# Patient Record
Sex: FEMALE | Race: WHITE | NOT HISPANIC OR LATINO | Employment: UNEMPLOYED | URBAN - METROPOLITAN AREA
[De-identification: names, ages, dates, MRNs, and addresses within clinical notes are randomized per-mention and may not be internally consistent; named-entity substitution may affect disease eponyms.]

---
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
## 2020-01-28 ENCOUNTER — OFFICE VISIT (OUTPATIENT)
Dept: FAMILY MEDICINE CLINIC | Facility: CLINIC | Age: 20
End: 2020-01-28
Payer: COMMERCIAL

## 2020-01-28 VITALS
BODY MASS INDEX: 22.63 KG/M2 | OXYGEN SATURATION: 97 % | WEIGHT: 135.8 LBS | HEIGHT: 65 IN | TEMPERATURE: 97.8 F | SYSTOLIC BLOOD PRESSURE: 104 MMHG | HEART RATE: 100 BPM | DIASTOLIC BLOOD PRESSURE: 70 MMHG | RESPIRATION RATE: 16 BRPM

## 2020-01-28 DIAGNOSIS — F41.9 ANXIETY: Primary | ICD-10-CM

## 2020-01-28 PROCEDURE — 3008F BODY MASS INDEX DOCD: CPT | Performed by: NURSE PRACTITIONER

## 2020-01-28 PROCEDURE — 99213 OFFICE O/P EST LOW 20 MIN: CPT | Performed by: NURSE PRACTITIONER

## 2020-01-28 NOTE — PROGRESS NOTES
FAMILY PRACTICE OFFICE VISIT       NAME: Sole Carter  AGE: 23 y o  SEX: female       : 2000        MRN: [id number]      Assessment and Plan     Problem List Items Addressed This Visit     None      Visit Diagnoses     Anxiety    -  Primary    Relevant Medications    sertraline (ZOLOFT) 50 mg tablet        1  Anxiety  sertraline (ZOLOFT) 50 mg tablet       This 60-year-old female presents today for anxiety and panic attacks  She struggled a lot with anxiety in high school  She never took any medications or attended counseling  She is seeking care now, because she does not want her own anxiety to interfere with the care of her baby  Recommend treatment with sertraline, start with 25 milligrams daily for 1 week, and then increase to 50 milligrams daily  Reviewed side effect profile of this medication  She is aware may cause suicidal ideations, instructed to go the emergency immediately if she is experiencing these thoughts  Reviewed onset of action and avoiding abrupt withdrawal of this medication  She verbalizes understanding  I do believe she would benefit from counseling, however she declines at this time  I have made her aware [facility] is available in our office, if she decides to pursue counseling  She will return to the office in 1 month for follow-up, she will call in the interim with any questions or concerns  Still have not been able to view her previous blood work completed at [facility]  Patient reports she completed forms at her ob/gyn office yesterday to leonel access to care everywhere  Will need to check TSH if not previously completed  Chief Complaint     Chief Complaint   Patient presents with    Anxiety     Pt is here for anxiety attacks, focus issues 3 + days, blood tests       History of Present Illness     Sole Carter is a 60-year-old female presenting today for anxiety      She has a history of struggling with anxiety in high school,  but has not had any problems since high school  until now  Two days ago she had a panic attack at home  Panic attack starts with her feeling very hot, her arms and legs feeling numb, and lightheaded  In the past, if symptoms persisted,  She would pass out  Symptoms usually last 30 minutes to 1 hour then resolved  When she feels a panic attack coming on she usually sits down, drinks a lot of water and tries to relax  She has never taken any medication for anxiety or panic attacks  She is concerned, because she has a 8month-old baby  She is a stay-at-home mom  She does not want anxiety or panic attacks to interfere with the care of her baby  She is easily overwhelmed  When she has a lot of things to do, she starts to feel panicky  She finds it difficult to focus on a simple tasks  Notes a tasks that usually should take 15 minutes ends up taking an hour  She does cry easily when she is overwhelmed  She has never been to counseling  She does have good support with her parents and her friends  Denies any feelings of depression  Denies any known family history of anxiety or depression  She does get some time to care for her own needs  Baby typically goes to bed around 8 p m , so she has evenings  She also has a horse,  That she enjoys spending time with  Her parents babysit, so she can spend time with her horse  She hopes to return to school to further her education in the future  Review of Systems   Review of Systems   Constitutional: Negative  Neurological: Positive for numbness  Negative for dizziness, weakness and headaches  Psychiatric/Behavioral: Negative for dysphoric mood, sleep disturbance and suicidal ideas  The patient is nervous/anxious  Active Problem List   There is no problem list on file for this patient        Past Medical History:  Past Medical History:   Diagnosis Date    Allergic        Past Surgical History:  Past Surgical History: Procedure Laterality Date     SECTION         Family History:  Family History   Problem Relation Age of Onset    Hypertension Mother     Factor V Leiden deficiency Mother     Diabetes Father        Social History:  Social History     Socioeconomic History    Marital status: /Civil Union     Spouse name: Not on file    Number of children: Not on file    Years of education: Not on file    Highest education level: Not on file   Occupational History    Not on file   Social Needs    Financial resource strain: Not on file    Food insecurity:     Worry: Not on file     Inability: Not on file    Transportation needs:     Medical: Not on file     Non-medical: Not on file   Tobacco Use    Smoking status: Never Smoker    Smokeless tobacco: Never Used   Substance and Sexual Activity    Alcohol use: Not Currently    Drug use: Never    Sexual activity: Yes     Partners: Male   Lifestyle    Physical activity:     Days per week: Not on file     Minutes per session: Not on file    Stress: Not on file   Relationships    Social connections:     Talks on phone: Not on file     Gets together: Not on file     Attends Gnosticist service: Not on file     Active member of club or organization: Not on file     Attends meetings of clubs or organizations: Not on file     Relationship status: Not on file    Intimate partner violence:     Fear of current or ex partner: Not on file     Emotionally abused: Not on file     Physically abused: Not on file     Forced sexual activity: Not on file   Other Topics Concern    Not on file   Social History Narrative        One child 6 months old 2019    Stay at home mom       I have reviewed the patient's medical history in detail; there are no changes to the history as noted in the electronic medical record      Objective     Vitals:    20 0945   BP: 104/70   Pulse: 100   Resp: 16   Temp: 97 8 °F (36 6 °C)   TempSrc: Tympanic   SpO2: 97% Weight: 61 6 kg (135 lb 12 8 oz)   Height: 5' 4 75" (1 645 m)     Wt Readings from Last 3 Encounters:   01/28/20 61 6 kg (135 lb 12 8 oz) (65 %, Z= 0 38)*   12/05/19 63 5 kg (140 lb) (71 %, Z= 0 56)*     * Growth percentiles are based on CDC (Girls, 2-20 Years) data  Physical Exam   Constitutional: She appears well-developed and well-nourished  No distress  Cardiovascular: Normal rate, regular rhythm and normal heart sounds  Pulmonary/Chest: Effort normal and breath sounds normal    Psychiatric: She has a normal mood and affect  Nursing note and vitals reviewed  ALLERGIES:  No Known Allergies    Current Medications     Current Outpatient Medications   Medication Sig Dispense Refill    desogestrel-ethinyl estradiol (APRI) 0 15-30 MG-MCG per tablet Take 1 tablet by mouth daily      multivitamin (THERAGRAN) TABS Take 1 tablet by mouth daily      sertraline (ZOLOFT) 50 mg tablet Take 1/2 tablet (25 mg) daily for 1 week, then increase to 1 tablet (50 mg) daily  30 tablet 1     No current facility-administered medications for this visit  Health Maintenance     Health Maintenance   Topic Date Due    DTaP,Tdap,and Td Vaccines (1 - Tdap) 09/18/2007    HPV Vaccine (1 - Female 2-dose series) 09/18/2011    HIV Screening  09/18/2015    Chlamydia Screening  09/18/2016    Annual Physical  09/18/2018    Influenza Vaccine  07/01/2019    Depression Screening PHQ  12/05/2020    BMI: Adult  01/28/2021    Pneumococcal Vaccine: 65+ Years (1 of 2 - PCV13) 09/18/2065    Pneumococcal Vaccine: Pediatrics (0 to 5 Years) and At-Risk Patients (6 to 59 Years)  Aged Out    HIB Vaccine  Aged Out    Hepatitis B Vaccine  Aged Out    IPV Vaccine  Aged Out    Hepatitis A Vaccine  Aged Out    Meningococcal ACWY Vaccine  Aged Out       There is no immunization history on file for this patient      Liberty Castaneda

## 2020-03-05 ENCOUNTER — OFFICE VISIT (OUTPATIENT)
Dept: FAMILY MEDICINE CLINIC | Facility: CLINIC | Age: 20
End: 2020-03-05
Payer: COMMERCIAL

## 2020-03-05 VITALS
SYSTOLIC BLOOD PRESSURE: 110 MMHG | BODY MASS INDEX: 22.73 KG/M2 | TEMPERATURE: 98.1 F | HEART RATE: 100 BPM | DIASTOLIC BLOOD PRESSURE: 80 MMHG | RESPIRATION RATE: 16 BRPM | OXYGEN SATURATION: 98 % | WEIGHT: 136.4 LBS | HEIGHT: 65 IN

## 2020-03-05 DIAGNOSIS — F41.9 ANXIETY: ICD-10-CM

## 2020-03-05 PROCEDURE — 1036F TOBACCO NON-USER: CPT | Performed by: NURSE PRACTITIONER

## 2020-03-05 PROCEDURE — 3008F BODY MASS INDEX DOCD: CPT | Performed by: NURSE PRACTITIONER

## 2020-03-05 PROCEDURE — 99213 OFFICE O/P EST LOW 20 MIN: CPT | Performed by: NURSE PRACTITIONER

## 2020-03-05 NOTE — PROGRESS NOTES
FAMILY PRACTICE OFFICE VISIT       NAME: Nazia Saavedra  AGE: 23 y o  SEX: female       : 2000        MRN: [id number]    DATE: 3/5/2020    Assessment and Plan     Problem List Items Addressed This Visit     None      Visit Diagnoses     Anxiety        Relevant Medications    sertraline (ZOLOFT) 50 mg tablet        1  Anxiety  sertraline (ZOLOFT) 50 mg tablet        This 49-year-old female presents today for follow-up on anxiety  Has been taking sertraline for approximately 1 month now, and has noted improvement  She is able to focus on tasks better  She has no further numbness or tingling in her extremities  She still experiences feeling overwhelmed when she has a lot of things to do before she needs to leave the house  Will increase sertraline from 50 milligrams daily to 75 milligrams daily  She will return to office in 4-6 weeks for follow-up  She will call in the interim if she has any questions or concerns  Chief Complaint     Chief Complaint   Patient presents with    Follow-up     Pt is here for f/u med check       History of Present Illness     Nazia Saavedra is a 49-year-old female presenting today for follow-up on anxiety  She started on sertraline approximately 1 month ago  Feels it is helping  She is able to focus better  No more numbness or tingling in her extremities  She does still get overwhelmed when she has a lot of things to do before she needs to leave the house  Sometimes she has to sit down and rest, because she is overwhelmed she can't get anything done  She has experienced no side effects from sertraline  Review of Systems   Review of Systems   Constitutional: Negative  Psychiatric/Behavioral: Negative for dysphoric mood, self-injury, sleep disturbance and suicidal ideas  The patient is nervous/anxious  Active Problem List   There is no problem list on file for this patient        Past Medical History:  Past Medical History:   Diagnosis Date    Allergic        Past Surgical History:  Past Surgical History:   Procedure Laterality Date     SECTION  2019       Family History:  Family History   Problem Relation Age of Onset    Hypertension Mother     Factor V Leiden deficiency Mother     Diabetes Father        Social History:  Social History     Socioeconomic History    Marital status: /Civil Union     Spouse name: Not on file    Number of children: Not on file    Years of education: Not on file    Highest education level: Not on file   Occupational History    Not on file   Social Needs    Financial resource strain: Not on file    Food insecurity:     Worry: Not on file     Inability: Not on file    Transportation needs:     Medical: Not on file     Non-medical: Not on file   Tobacco Use    Smoking status: Never Smoker    Smokeless tobacco: Never Used   Substance and Sexual Activity    Alcohol use: Not Currently    Drug use: Never    Sexual activity: Yes     Partners: Male   Lifestyle    Physical activity:     Days per week: Not on file     Minutes per session: Not on file    Stress: Not on file   Relationships    Social connections:     Talks on phone: Not on file     Gets together: Not on file     Attends Voodoo service: Not on file     Active member of club or organization: Not on file     Attends meetings of clubs or organizations: Not on file     Relationship status: Not on file    Intimate partner violence:     Fear of current or ex partner: Not on file     Emotionally abused: Not on file     Physically abused: Not on file     Forced sexual activity: Not on file   Other Topics Concern    Not on file   Social History Narrative        One child 6 months old 2019    Stay at home mom       I have reviewed the patient's medical history in detail; there are no changes to the history as noted in the electronic medical record      Objective     Vitals:    20 1000   BP: 110/80   Pulse: 100 Resp: 16   Temp: 98 1 °F (36 7 °C)   TempSrc: Tympanic   SpO2: 98%   Weight: 61 9 kg (136 lb 6 4 oz)   Height: 5' 4 75" (1 645 m)     Wt Readings from Last 3 Encounters:   03/05/20 61 9 kg (136 lb 6 4 oz) (65 %, Z= 0 39)*   01/28/20 61 6 kg (135 lb 12 8 oz) (65 %, Z= 0 38)*   12/05/19 63 5 kg (140 lb) (71 %, Z= 0 56)*     * Growth percentiles are based on CDC (Girls, 2-20 Years) data  Physical Exam   Constitutional: She appears well-developed and well-nourished  No distress  Cardiovascular: Normal rate, regular rhythm and normal heart sounds  Pulmonary/Chest: Effort normal and breath sounds normal    Psychiatric: She has a normal mood and affect  Nursing note and vitals reviewed  ALLERGIES:  No Known Allergies    Current Medications     Current Outpatient Medications   Medication Sig Dispense Refill    desogestrel-ethinyl estradiol (APRI) 0 15-30 MG-MCG per tablet Take 1 tablet by mouth daily      multivitamin (THERAGRAN) TABS Take 1 tablet by mouth daily      sertraline (ZOLOFT) 50 mg tablet Take 1 5 tablets (75 mg) daily 45 tablet 5     No current facility-administered medications for this visit  Health Maintenance     Health Maintenance   Topic Date Due    DTaP,Tdap,and Td Vaccines (1 - Tdap) 09/18/2007    HPV Vaccine (1 - Female 2-dose series) 09/18/2011    HIV Screening  09/18/2015    Chlamydia Screening  09/18/2016    Annual Physical  09/18/2018    Influenza Vaccine  07/01/2019    Depression Screening PHQ  12/05/2020    BMI: Adult  03/05/2021    Pneumococcal Vaccine: 65+ Years (1 of 2 - PCV13) 09/18/2065    Pneumococcal Vaccine: Pediatrics (0 to 5 Years) and At-Risk Patients (6 to 59 Years)  Aged Out    HIB Vaccine  Aged Out    Hepatitis B Vaccine  Aged Out    IPV Vaccine  Aged Out    Hepatitis A Vaccine  Aged Out    Meningococcal ACWY Vaccine  Aged Out       There is no immunization history on file for this patient      Liberty Villegas

## 2023-01-26 ENCOUNTER — OFFICE VISIT (OUTPATIENT)
Dept: FAMILY MEDICINE CLINIC | Facility: CLINIC | Age: 23
End: 2023-01-26

## 2023-01-26 VITALS
OXYGEN SATURATION: 99 % | SYSTOLIC BLOOD PRESSURE: 110 MMHG | BODY MASS INDEX: 25.33 KG/M2 | TEMPERATURE: 98.4 F | HEIGHT: 65 IN | WEIGHT: 152 LBS | RESPIRATION RATE: 16 BRPM | DIASTOLIC BLOOD PRESSURE: 80 MMHG | HEART RATE: 104 BPM

## 2023-01-26 DIAGNOSIS — J32.9 SINUSITIS, UNSPECIFIED CHRONICITY, UNSPECIFIED LOCATION: Primary | ICD-10-CM

## 2023-01-26 RX ORDER — AMOXICILLIN AND CLAVULANATE POTASSIUM 875; 125 MG/1; MG/1
1 TABLET, FILM COATED ORAL EVERY 12 HOURS SCHEDULED
Qty: 14 TABLET | Refills: 0 | Status: SHIPPED | OUTPATIENT
Start: 2023-01-26 | End: 2023-02-02

## 2023-01-26 NOTE — PROGRESS NOTES
FAMILY PRACTICE OFFICE VISIT       NAME: Jaycob Key  AGE: 25 y o  SEX: female       : 2000        MRN: [id number]    Assessment and Plan   1  Sinusitis, unspecified chronicity, unspecified location  -     amoxicillin-clavulanate (AUGMENTIN) 875-125 mg per tablet; Take 1 tablet by mouth every 12 (twelve) hours for 7 days     Start Augmentin 1 tablet twice daily with food  Call if symptoms do not improve with antibiotic  May continue to use Claritin and Tylenol as needed  Chief Complaint     Chief Complaint   Patient presents with   • Cold Like Symptoms     Sinus, sore throat, earache, headache and cough 1 + wk       History of Present Illness     Ana Lilia Sung is a 25year old female presenting today for URI symptoms  Family has been sick a lot recently  Started 1 week ago  Negative home COVID-19 test   Chills, sweats  SInus pressure  Lost voice  Sore throat  Headaches  Ear pressure  Nasal congestion  Mild cough  No Nausea, vomiting, or diarrhea  No chest tightness, Wheezing, shortness of breath  OTC Claritin  Helped a little  Tylenol as needed  Review of Systems   Review of Systems   Constitutional: Positive for chills, diaphoresis and fatigue  Negative for fever  HENT: Positive for congestion, ear pain (pressure), postnasal drip, rhinorrhea, sinus pressure, sore throat and voice change  Respiratory: Positive for cough  Negative for chest tightness, shortness of breath and wheezing  Cardiovascular: Negative  Gastrointestinal: Negative  Musculoskeletal: Negative for myalgias  Neurological: Positive for headaches  I have reviewed the patient's medical history in detail; there are no changes to the history as noted in the electronic medical record      Objective     Vitals:    23 1113   BP: 110/80   Pulse: 104   Resp: 16   Temp: 98 4 °F (36 9 °C)   TempSrc: Temporal   SpO2: 99%   Weight: 68 9 kg (152 lb)   Height: 5' 4 96" (1 65 m) Wt Readings from Last 3 Encounters:   01/26/23 68 9 kg (152 lb)   03/05/20 61 9 kg (136 lb 6 4 oz) (65 %, Z= 0 39)*   01/28/20 61 6 kg (135 lb 12 8 oz) (65 %, Z= 0 38)*     * Growth percentiles are based on [facility] (Girls, 2-20 Years) data  Physical Exam  Vitals and nursing note reviewed  Constitutional:       General: She is not in acute distress  Appearance: Normal appearance  She is not ill-appearing  HENT:      Head: Atraumatic  Right Ear: Tympanic membrane normal       Left Ear: Tympanic membrane normal       Nose: Congestion and rhinorrhea present  Mouth/Throat:      Mouth: Mucous membranes are moist       Pharynx: Oropharynx is clear  Eyes:      Conjunctiva/sclera: Conjunctivae normal    Cardiovascular:      Rate and Rhythm: Normal rate and regular rhythm  Heart sounds: No murmur heard  Pulmonary:      Effort: Pulmonary effort is normal  No respiratory distress  Breath sounds: Normal breath sounds  Musculoskeletal:      Cervical back: Normal range of motion and neck supple  Right lower leg: No edema  Left lower leg: No edema  Lymphadenopathy:      Cervical: No cervical adenopathy  Neurological:      Mental Status: She is alert  Psychiatric:         Mood and Affect: Mood normal        BMI Counseling: Body mass index is 25 32 kg/m²  The BMI is above normal  Exercise recommendations include exercising 3-5 times per week  Rationale for BMI follow-up plan is due to patient being overweight or obese  Weight is acceptable for age  Depression Screening and Follow-up Plan: Patient was screened for depression during today's encounter  They screened negative with a PHQ-2 score of 0          ALLERGIES:  No Known Allergies    Current Medications     Current Outpatient Medications   Medication Sig Dispense Refill   • amoxicillin-clavulanate (AUGMENTIN) 875-125 mg per tablet Take 1 tablet by mouth every 12 (twelve) hours for 7 days 14 tablet 0   • multivitamin [facility]) TABS Take 1 tablet by mouth daily     • PARAGARD INTRAUTERINE COPPER IU by Intrauterine route       No current facility-administered medications for this visit  Health Maintenance     Health Maintenance   Topic Date Due   • Hepatitis C Screening  Never done   • HPV Vaccine (1 - 2-dose series) Never done   • HIV Screening  Never done   • Chlamydia Screening  Never done   • Annual Physical  Never done   • DTaP,Tdap,and Td Vaccines (1 - Tdap) Never done   • Cervical Cancer Screening  Never done   • COVID-19 Vaccine (1) 04/27/2023 (Originally [birth year]18/2001)   • Influenza Vaccine (1) 06/30/2023 (Originally 9/1/2022)   • Depression Screening  01/26/2024   • BMI: Followup Plan  01/26/2024   • BMI: Adult  01/26/2024   • Pneumococcal Vaccine: Pediatrics (0 to 5 Years) and At-Risk Patients (6 to 59 Years)  Aged Out   • HIB Vaccine  Aged Out   • IPV Vaccine  Aged Out   • Hepatitis A Vaccine  Aged Out   • Meningococcal ACWY Vaccine  Aged Out       There is no immunization history on file for this patient      Liberty Alcantar

## 2023-06-07 ENCOUNTER — OFFICE VISIT (OUTPATIENT)
Dept: FAMILY MEDICINE CLINIC | Facility: CLINIC | Age: 23
End: 2023-06-07
Payer: COMMERCIAL

## 2023-06-07 VITALS
BODY MASS INDEX: 26.26 KG/M2 | RESPIRATION RATE: 18 BRPM | HEART RATE: 95 BPM | HEIGHT: 65 IN | SYSTOLIC BLOOD PRESSURE: 122 MMHG | TEMPERATURE: 98.2 F | OXYGEN SATURATION: 96 % | DIASTOLIC BLOOD PRESSURE: 88 MMHG | WEIGHT: 157.6 LBS

## 2023-06-07 DIAGNOSIS — R10.30 LOWER ABDOMINAL PAIN: Primary | ICD-10-CM

## 2023-06-07 DIAGNOSIS — Z97.5 IUD (INTRAUTERINE DEVICE) IN PLACE: ICD-10-CM

## 2023-06-07 DIAGNOSIS — Z83.2 FAMILY HISTORY OF FACTOR V LEIDEN MUTATION: ICD-10-CM

## 2023-06-07 LAB
BACTERIA UR QL AUTO: NORMAL /HPF
BILIRUB UR QL STRIP: NEGATIVE
CLARITY UR: CLEAR
COLOR UR: ABNORMAL
GLUCOSE UR STRIP-MCNC: NEGATIVE MG/DL
HGB UR QL STRIP.AUTO: NEGATIVE
KETONES UR STRIP-MCNC: NEGATIVE MG/DL
LEUKOCYTE ESTERASE UR QL STRIP: ABNORMAL
NITRITE UR QL STRIP: NEGATIVE
NON-SQ EPI CELLS URNS QL MICRO: NORMAL /HPF
PH UR STRIP.AUTO: 7 [PH]
PROT UR STRIP-MCNC: ABNORMAL MG/DL
RBC #/AREA URNS AUTO: NORMAL /HPF
SL AMB  POCT GLUCOSE, UA: NEGATIVE
SL AMB LEUKOCYTE ESTERASE,UA: ABNORMAL
SL AMB POCT BILIRUBIN,UA: NEGATIVE
SL AMB POCT BLOOD,UA: NEGATIVE
SL AMB POCT CLARITY,UA: ABNORMAL
SL AMB POCT COLOR,UA: YELLOW
SL AMB POCT KETONES,UA: NEGATIVE
SL AMB POCT NITRITE,UA: NEGATIVE
SL AMB POCT PH,UA: 7
SL AMB POCT SPECIFIC GRAVITY,UA: 1.02
SL AMB POCT URINE PROTEIN: ABNORMAL
SL AMB POCT UROBILINOGEN: 0.2
SP GR UR STRIP.AUTO: 1.02 (ref 1–1.03)
UROBILINOGEN UR STRIP-ACNC: <2 MG/DL
WBC #/AREA URNS AUTO: NORMAL /HPF

## 2023-06-07 PROCEDURE — 81002 URINALYSIS NONAUTO W/O SCOPE: CPT | Performed by: NURSE PRACTITIONER

## 2023-06-07 PROCEDURE — 99213 OFFICE O/P EST LOW 20 MIN: CPT | Performed by: NURSE PRACTITIONER

## 2023-06-07 PROCEDURE — 81001 URINALYSIS AUTO W/SCOPE: CPT | Performed by: NURSE PRACTITIONER
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
## 2023-06-08 DIAGNOSIS — Z83.2 FAMILY HISTORY OF FACTOR V LEIDEN MUTATION: Primary | ICD-10-CM

## 2023-06-08 DIAGNOSIS — R80.9 PROTEINURIA, UNSPECIFIED TYPE: ICD-10-CM

## 2023-06-15 ENCOUNTER — APPOINTMENT (OUTPATIENT)
Dept: LAB | Facility: CLINIC | Age: 23
End: 2023-06-15
Payer: COMMERCIAL

## 2023-06-15 DIAGNOSIS — Z83.2 FAMILY HISTORY OF FACTOR V LEIDEN MUTATION: ICD-10-CM

## 2023-06-15 DIAGNOSIS — R80.9 PROTEINURIA, UNSPECIFIED TYPE: ICD-10-CM

## 2023-06-15 LAB
BACTERIA UR QL AUTO: ABNORMAL /HPF
BILIRUB UR QL STRIP: NEGATIVE
CAOX CRY URNS QL MICRO: ABNORMAL /HPF
CLARITY UR: CLEAR
COLOR UR: YELLOW
GLUCOSE UR STRIP-MCNC: NEGATIVE MG/DL
HGB UR QL STRIP.AUTO: NEGATIVE
KETONES UR STRIP-MCNC: NEGATIVE MG/DL
LEUKOCYTE ESTERASE UR QL STRIP: NEGATIVE
MUCOUS THREADS UR QL AUTO: ABNORMAL
NITRITE UR QL STRIP: NEGATIVE
NON-SQ EPI CELLS URNS QL MICRO: ABNORMAL /HPF
PH UR STRIP.AUTO: 6.5 [PH]
PROT UR STRIP-MCNC: ABNORMAL MG/DL
RBC #/AREA URNS AUTO: ABNORMAL /HPF
SP GR UR STRIP.AUTO: 1.04 (ref 1–1.03)
UROBILINOGEN UR STRIP-ACNC: <2 MG/DL
WBC #/AREA URNS AUTO: ABNORMAL /HPF

## 2023-06-15 PROCEDURE — 81001 URINALYSIS AUTO W/SCOPE: CPT

## 2023-06-18 DIAGNOSIS — R80.9 PROTEINURIA, UNSPECIFIED TYPE: Primary | ICD-10-CM

## 2023-06-19 ENCOUNTER — TELEPHONE (OUTPATIENT)
Dept: FAMILY MEDICINE CLINIC | Facility: CLINIC | Age: 23
End: 2023-06-19

## 2023-06-19 NOTE — TELEPHONE ENCOUNTER
----- [id number] at 6/18/2023  3:51 PM EDT -----  There is still protein in the urine  Please repeat urine sample--first morning sample in 1-2 weeks

## 2023-06-22 ENCOUNTER — APPOINTMENT (OUTPATIENT)
Dept: LAB | Facility: CLINIC | Age: 23
End: 2023-06-22
Payer: COMMERCIAL

## 2023-06-22 DIAGNOSIS — R80.9 PROTEINURIA, UNSPECIFIED TYPE: ICD-10-CM

## 2023-06-22 LAB
BACTERIA UR QL AUTO: ABNORMAL /HPF
BILIRUB UR QL STRIP: NEGATIVE
CLARITY UR: CLEAR
COLOR UR: ABNORMAL
GLUCOSE UR STRIP-MCNC: NEGATIVE MG/DL
HGB UR QL STRIP.AUTO: NEGATIVE
KETONES UR STRIP-MCNC: NEGATIVE MG/DL
LEUKOCYTE ESTERASE UR QL STRIP: NEGATIVE
MUCOUS THREADS UR QL AUTO: ABNORMAL
NITRITE UR QL STRIP: NEGATIVE
NON-SQ EPI CELLS URNS QL MICRO: ABNORMAL /HPF
PH UR STRIP.AUTO: 6.5 [PH]
PROT UR STRIP-MCNC: ABNORMAL MG/DL
RBC #/AREA URNS AUTO: ABNORMAL /HPF
SP GR UR STRIP.AUTO: 1.02 (ref 1–1.03)
UROBILINOGEN UR STRIP-ACNC: <2 MG/DL
WBC #/AREA URNS AUTO: ABNORMAL /HPF

## 2023-06-22 PROCEDURE — 81001 URINALYSIS AUTO W/SCOPE: CPT

## 2023-06-25 DIAGNOSIS — R80.1 PERSISTENT PROTEINURIA: Primary | ICD-10-CM

## 2023-06-26 ENCOUNTER — TELEPHONE (OUTPATIENT)
Dept: NEPHROLOGY | Facility: CLINIC | Age: 23
End: 2023-06-26

## 2023-06-26 NOTE — TELEPHONE ENCOUNTER
Have you seen a nephrologist before or are you on dialysis? NO & NO    Have you been recently hospitalized? NO    If you get labs done, do you go to [facility]? if not, where? [facility]    Do you have history of kidney stones?  NO    INS: AETNA/AETNA PPO  INS REF:NO    DIAGNOSIS: R80 1 (ICD-10-CM) - Persistent proteinuria    REFERRED BY: PCP  APPT: 10/20/23 @ [facility]/ Dr Alexander Adame  Added to waitlist

## 2023-06-27 ENCOUNTER — APPOINTMENT (OUTPATIENT)
Dept: LAB | Facility: CLINIC | Age: 23
End: 2023-06-27
Payer: COMMERCIAL

## 2023-06-27 DIAGNOSIS — R80.1 PERSISTENT PROTEINURIA: ICD-10-CM

## 2023-06-27 LAB
ALBUMIN SERPL BCP-MCNC: 3.7 G/DL (ref 3.5–5)
ALP SERPL-CCNC: 67 U/L (ref 46–116)
ALT SERPL W P-5'-P-CCNC: 18 U/L (ref 12–78)
ANION GAP SERPL CALCULATED.3IONS-SCNC: 3 MMOL/L
AST SERPL W P-5'-P-CCNC: 11 U/L (ref 5–45)
BILIRUB SERPL-MCNC: 0.84 MG/DL (ref 0.2–1)
BUN SERPL-MCNC: 13 MG/DL (ref 5–25)
CALCIUM SERPL-MCNC: 9.3 MG/DL (ref 8.3–10.1)
CHLORIDE SERPL-SCNC: 109 MMOL/L (ref 96–108)
CO2 SERPL-SCNC: 27 MMOL/L (ref 21–32)
CREAT SERPL-MCNC: 0.77 MG/DL (ref 0.6–1.3)
GFR SERPL CREATININE-BSD FRML MDRD: 109 ML/MIN/1.73SQ M
GLUCOSE P FAST SERPL-MCNC: 101 MG/DL (ref 65–99)
POTASSIUM SERPL-SCNC: 4.1 MMOL/L (ref 3.5–5.3)
PROT SERPL-MCNC: 7.6 G/DL (ref 6.4–8.4)
SODIUM SERPL-SCNC: 139 MMOL/L (ref 135–147)

## 2023-06-27 PROCEDURE — 80053 COMPREHEN METABOLIC PANEL: CPT

## 2023-06-27 PROCEDURE — 36415 COLL VENOUS BLD VENIPUNCTURE: CPT

## 2023-06-30 ENCOUNTER — HOSPITAL ENCOUNTER (OUTPATIENT)
Dept: RADIOLOGY | Age: 23
Discharge: HOME | End: 2023-06-30
Payer: COMMERCIAL

## 2023-06-30 DIAGNOSIS — R80.1 PERSISTENT PROTEINURIA: ICD-10-CM

## 2023-06-30 PROCEDURE — 76775 US EXAM ABDO BACK WALL LIM: CPT

## 2023-07-06 DIAGNOSIS — R80.9 PROTEINURIA, UNSPECIFIED TYPE: Primary | ICD-10-CM

## 2023-08-09 ENCOUNTER — TELEPHONE (OUTPATIENT)
Dept: PSYCHIATRY | Facility: CLINIC | Age: 23
End: 2023-08-09

## 2023-08-09 ENCOUNTER — OFFICE VISIT (OUTPATIENT)
Dept: FAMILY MEDICINE CLINIC | Facility: CLINIC | Age: 23
End: 2023-08-09
Payer: COMMERCIAL

## 2023-08-09 VITALS
BODY MASS INDEX: 26.49 KG/M2 | HEIGHT: 65 IN | TEMPERATURE: 98.2 F | DIASTOLIC BLOOD PRESSURE: 78 MMHG | HEART RATE: 89 BPM | OXYGEN SATURATION: 98 % | SYSTOLIC BLOOD PRESSURE: 110 MMHG | RESPIRATION RATE: 16 BRPM | WEIGHT: 159 LBS

## 2023-08-09 DIAGNOSIS — F32.A ANXIETY AND DEPRESSION: ICD-10-CM

## 2023-08-09 DIAGNOSIS — Z13.9 SCREENING DUE: ICD-10-CM

## 2023-08-09 DIAGNOSIS — F41.9 ANXIETY AND DEPRESSION: ICD-10-CM

## 2023-08-09 DIAGNOSIS — R10.32 LLQ ABDOMINAL PAIN: ICD-10-CM

## 2023-08-09 DIAGNOSIS — R80.9 PROTEINURIA, UNSPECIFIED TYPE: Primary | ICD-10-CM

## 2023-08-09 PROCEDURE — 99214 OFFICE O/P EST MOD 30 MIN: CPT | Performed by: FAMILY MEDICINE

## 2023-08-09 RX ORDER — ESCITALOPRAM OXALATE 10 MG/1
10 TABLET ORAL DAILY
Qty: 60 TABLET | Refills: 0 | Status: SHIPPED | OUTPATIENT
Start: 2023-08-09

## 2023-08-09 NOTE — ASSESSMENT & PLAN NOTE
Was following with therapist, would like to re-establish. Was on Zoloft which she did not like due to feeling angry/activated all the time. I have suggested a trial of a more relaxing SSRI, Lexapro and she is agreeable to try that. Follow up in 4-6 weeks.

## 2023-08-09 NOTE — TELEPHONE ENCOUNTER
Contacted pt in regards to routine referral and to get placed on proper wait list.     Pt prefers [facility], [address], and [facility] loc. Open and prefers Virtual and a Female provider. Also sending out outside resource packet.

## 2023-08-09 NOTE — PROGRESS NOTES
Name: Paula Anders      : 2000      MRN: [id number]  Encounter Provider: Ashlyn Bang DO  Encounter Date: 2023   Encounter department: [address] [place] [address]     1. Proteinuria, unspecified type  -     UA w Reflex to Microscopic w Reflex to Culture -Lab Collect; Future; Expected date: 2023  -     CBC and differential; Future  -     Basic metabolic panel; Future    2. Screening due  -     Chlamydia/GC amplified DNA by PCR; Future  -     : HIV 1/2 AB/AG w Reflex [facility] for 2 yr old and above; Future  -     Hepatitis C antibody; Future    3. LLQ abdominal pain  -     CT abdomen pelvis w contrast; Future; Expected date: 2023    4. Anxiety and depression  Assessment & Plan:  Was following with therapist, would like to re-establish. Was on Zoloft which she did not like due to feeling angry/activated all the time. I have suggested a trial of a more relaxing SSRI, Lexapro and she is agreeable to try that. Follow up in 4-6 weeks. Orders:  -     Ambulatory Referral to [facility]; Future  -     Ambulatory Referral to [facility]; Future  -     escitalopram (Lexapro) 10 mg tablet; Take 1 tablet (10 mg total) by mouth daily           Subjective      HPI   7 months ago had paraguard in for 2 years, having severe daily abdominal pain, discussed with OB, US was normal. Labs showed increased protein in the urine, repeat UA showed 1+ proteinuria and calcium oxalate crystals, then second repeat UA showed trace protein again. She was recommended to follow up with Nephrology, was not able to schedule an appointment until October. Currently has period, having pain in the lower abdomen throughout. Generally only has pain in the LLQ which is described as sharp and shooting, 10/10 at worst until Tylenol starts to work. Pain is not constant, comes out of nowhere. Denies dysuria and hematuria. Had IUD out May 22, using condoms since then.  Currently on period. Having a lot of anxiety and stress. Tried Zoloft but felt angry all the time. Getting a divorce, daughter is 3 yo and dad isnt helping. Boyfriend has a lot going on. Therapist no longer working with her. Review of Systems as in HPI    Current Outpatient Medications on File Prior to Visit   Medication Sig   • multivitamin (THERAGRAN) TABS Take 1 tablet by mouth daily   • meloxicam (MOBIC) 7.5 mg tablet Take 1 tablet (7.5 mg total) by mouth daily (Patient not taking: Reported on 8/9/2023)   • [DISCONTINUED] PARAGARD INTRAUTERINE COPPER IU by Intrauterine route (Patient not taking: Reported on 8/9/2023)       Objective     /78 (BP Location: Right arm, Patient Position: Sitting, Cuff Size: Standard)   Pulse 89   Temp 98.2 °F (36.8 °C) (Temporal)   Resp 16   Ht 5' 4.75" (1.645 m)   Wt 72.1 kg (159 lb)   SpO2 98%   BMI 26.66 kg/m²     Physical Exam  Vitals reviewed. Constitutional:       Appearance: She is well-developed. HENT:      Head: Normocephalic and atraumatic. Right Ear: External ear normal.      Left Ear: External ear normal.      Nose: Nose normal.      Mouth/Throat:      Mouth: Mucous membranes are moist.      Pharynx: Oropharynx is clear. Eyes:      Extraocular Movements: Extraocular movements intact. Conjunctiva/sclera: Conjunctivae normal.      Pupils: Pupils are equal, round, and reactive to light. Cardiovascular:      Rate and Rhythm: Normal rate and regular rhythm. Pulses: Normal pulses. Heart sounds: Normal heart sounds. Pulmonary:      Effort: Pulmonary effort is normal.      Breath sounds: Normal breath sounds. Abdominal:      General: Abdomen is flat. Bowel sounds are normal. There is no distension. Palpations: Abdomen is soft. Tenderness: There is no abdominal tenderness. Musculoskeletal:         General: Normal range of motion. Skin:     General: Skin is warm and dry.       Capillary Refill: Capillary refill takes less than 2 seconds. Neurological:      General: No focal deficit present. Mental Status: She is alert and oriented to person, place, and time.    Psychiatric:         Mood and Affect: Mood normal.         Behavior: Behavior normal.       Aline Radha, DO

## 2023-08-10 ENCOUNTER — APPOINTMENT (OUTPATIENT)
Dept: LAB | Facility: CLINIC | Age: 23
End: 2023-08-10
Payer: COMMERCIAL

## 2023-08-10 ENCOUNTER — TELEPHONE (OUTPATIENT)
Dept: FAMILY MEDICINE CLINIC | Facility: CLINIC | Age: 23
End: 2023-08-10

## 2023-08-10 DIAGNOSIS — Z13.9 SCREENING DUE: ICD-10-CM

## 2023-08-10 DIAGNOSIS — R80.9 PROTEINURIA, UNSPECIFIED TYPE: ICD-10-CM

## 2023-08-10 LAB
ANION GAP SERPL CALCULATED.3IONS-SCNC: 4 MMOL/L
BASOPHILS # BLD AUTO: 0.07 THOUSANDS/ÂΜL (ref 0–0.1)
BASOPHILS NFR BLD AUTO: 1 % (ref 0–1)
BILIRUB UR QL STRIP: NEGATIVE
BUN SERPL-MCNC: 11 MG/DL (ref 5–25)
CALCIUM SERPL-MCNC: 9.1 MG/DL (ref 8.3–10.1)
CHLORIDE SERPL-SCNC: 111 MMOL/L (ref 96–108)
CLARITY UR: CLEAR
CO2 SERPL-SCNC: 25 MMOL/L (ref 21–32)
COLOR UR: YELLOW
CREAT SERPL-MCNC: 0.76 MG/DL (ref 0.6–1.3)
CREAT UR-MCNC: 136 MG/DL
EOSINOPHIL # BLD AUTO: 0.16 THOUSAND/ÂΜL (ref 0–0.61)
EOSINOPHIL NFR BLD AUTO: 2 % (ref 0–6)
ERYTHROCYTE [DISTWIDTH] IN BLOOD BY AUTOMATED COUNT: 13.6 % (ref 11.6–15.1)
GFR SERPL CREATININE-BSD FRML MDRD: 111 ML/MIN/1.73SQ M
GLUCOSE P FAST SERPL-MCNC: 92 MG/DL (ref 65–99)
GLUCOSE UR STRIP-MCNC: NEGATIVE MG/DL
HCT VFR BLD AUTO: 38.7 % (ref 34.8–46.1)
HCV AB SER QL: NORMAL
HGB BLD-MCNC: 12.5 G/DL (ref 11.5–15.4)
HGB UR QL STRIP.AUTO: NEGATIVE
HIV 1+2 AB+HIV1 P24 AG SERPL QL IA: NORMAL
HIV 2 AB SERPL QL IA: NORMAL
HIV1 AB SERPL QL IA: NORMAL
HIV1 P24 AG SERPL QL IA: NORMAL
IMM GRANULOCYTES # BLD AUTO: 0.01 THOUSAND/UL (ref 0–0.2)
IMM GRANULOCYTES NFR BLD AUTO: 0 % (ref 0–2)
KETONES UR STRIP-MCNC: NEGATIVE MG/DL
LEUKOCYTE ESTERASE UR QL STRIP: NEGATIVE
LYMPHOCYTES # BLD AUTO: 2.2 THOUSANDS/ÂΜL (ref 0.6–4.47)
LYMPHOCYTES NFR BLD AUTO: 32 % (ref 14–44)
MCH RBC QN AUTO: 29.8 PG (ref 26.8–34.3)
MCHC RBC AUTO-ENTMCNC: 32.3 G/DL (ref 31.4–37.4)
MCV RBC AUTO: 92 FL (ref 82–98)
MONOCYTES # BLD AUTO: 0.64 THOUSAND/ÂΜL (ref 0.17–1.22)
MONOCYTES NFR BLD AUTO: 9 % (ref 4–12)
NEUTROPHILS # BLD AUTO: 3.89 THOUSANDS/ÂΜL (ref 1.85–7.62)
NEUTS SEG NFR BLD AUTO: 56 % (ref 43–75)
NITRITE UR QL STRIP: NEGATIVE
NRBC BLD AUTO-RTO: 0 /100 WBCS
PH UR STRIP.AUTO: 7 [PH]
PLATELET # BLD AUTO: 308 THOUSANDS/UL (ref 149–390)
PMV BLD AUTO: 11 FL (ref 8.9–12.7)
POTASSIUM SERPL-SCNC: 3.8 MMOL/L (ref 3.5–5.3)
PROT UR STRIP-MCNC: NEGATIVE MG/DL
PROT UR-MCNC: 14 MG/DL
PROT/CREAT UR: 0.1 MG/G{CREAT} (ref 0–0.1)
RBC # BLD AUTO: 4.2 MILLION/UL (ref 3.81–5.12)
SODIUM SERPL-SCNC: 140 MMOL/L (ref 135–147)
SP GR UR STRIP.AUTO: 1.02 (ref 1–1.03)
UROBILINOGEN UR STRIP-ACNC: <2 MG/DL
WBC # BLD AUTO: 6.97 THOUSAND/UL (ref 4.31–10.16)

## 2023-08-10 PROCEDURE — 85025 COMPLETE CBC W/AUTO DIFF WBC: CPT

## 2023-08-10 PROCEDURE — 81003 URINALYSIS AUTO W/O SCOPE: CPT

## 2023-08-10 PROCEDURE — 80048 BASIC METABOLIC PNL TOTAL CA: CPT

## 2023-08-10 PROCEDURE — 36415 COLL VENOUS BLD VENIPUNCTURE: CPT

## 2023-08-10 PROCEDURE — 87491 CHLMYD TRACH DNA AMP PROBE: CPT

## 2023-08-10 PROCEDURE — 86803 HEPATITIS C AB TEST: CPT

## 2023-08-10 PROCEDURE — 82570 ASSAY OF URINE CREATININE: CPT

## 2023-08-10 PROCEDURE — 87591 N.GONORRHOEAE DNA AMP PROB: CPT

## 2023-08-10 PROCEDURE — 84156 ASSAY OF PROTEIN URINE: CPT

## 2023-08-10 PROCEDURE — 87389 HIV-1 AG W/HIV-1&-2 AB AG IA: CPT

## 2023-08-10 NOTE — TELEPHONE ENCOUNTER
Called and spoke with pt. Pt is aware. Nutrition Assessment   Reason for Consult/Assessment: Follow up.     Diagnosis and Hx: Reviewed    Pertinent Nutrition History: Has been living at [facility] since 4/4. Tells me she is a picky eater so she does not always eat the foods that they provide. She often skips breakfast and says will eat something at lunch and dinner but may not have the full meal they provide depending on what the options are. She says she does have a card she can buy groceries with but does not feel comfortable taking public transporation to go get food and she has not been able to get someone from [facility] to take her shopping with the van yet, though she says this is a service they can provide. Has poor dentition so there are certain foods she can't eat. She does not add salt to her food.    Pertinent Nutrition Information: Labs and medications reviewed. LBM 5/9 x 2                                 Diet Order: Cardiac, Fluid restriction, Oral nutrition supplement/snacks         Nutrition supplement/snacks: ProSource Gelatein ordered once daily with lunch, does not like this and has not been consuming this. Will alter supplement to one Ensure Max, patient aware this will count towards fluid restriction        Diet tolerance: Other (comment)Meal intakes are variable this admission, consumes 100% of some meals, skips others. Ordered breakfast and lunch this morning- lunch tray of hamburger, chips and chocolate milk arrived during visit today  Food Allergies: None known    Demographic/Anthropometrics Information  Gender: female  Patient Age: 64 year old  Height:   Ht Readings from Last 1 Encounters:   05/02/23 5' 4.17\" (1.63 m)      Weight:   Wt Readings from Last 1 Encounters:   05/10/23 93 kg      BMI:   BMI Readings from Last 1 Encounters:   05/10/23 35.02 kg/m²          % Weight Change: No weight history available, she reports weight gain           Skin Assessment/Wounds  Wounds Present: Wounds present (venous insufficiency  ulcers with secondary cellulitis.  Unroofed blisters of the bilateral dorsal feet)          TREATMENT PLAN: Monitoring & Interventions   Intervention: Meals and snacks, Nutrition supplement therapy, Nutrition education            Meals & snacks: Encouraged nutritionally balanced meals with high protien foods, reviewed sources of protein                                                           Nutrition supplement therapy: Will discontinue ProsSource Gelatein and tiral one Ensure Max daily per discussion with patient     Nutrition education: Discussed role of nutrition for overall health and skin integrity. Encouraged patient to try her best to get a variety of lean protein sources, vegetables, fruit and whole grains. When she does have the opportunity to grocery shop or select snacks from [employer], discussed nutrient dense, low sodium snacks to pick more often.    Goal: Achieve normal electrolytes, Improve skin integrity, Increase oral intake to >/equal 75% of meals and supplements, Tolerate oral diet   Intervention goal status: Initiated  Time frame to achieve goal: Ongoing     Dietitian will monitor: Anthropometric Measurements, Food, beverage, and nutrient intake, Biochemical data, medical tests, procedures            Nutrition Diagnosis / PES  Nutrition Diagnosis: Inadequate oral intake  Related to: Increased nutritional demands for healing (decreased appetite, food preferences)   As evidenced by: Calculated needs, Diet history/recall      Primary Nutrition Diagnosis status: Active nutrition diagnosis

## 2023-08-10 NOTE — TELEPHONE ENCOUNTER
----- Message from Ed Sunshine sent at 8/10/2023  1:04 PM EDT -----  Urine protein/creatinine ratio is normal.   Please keep nephrology appointment in October.

## 2023-08-11 LAB
C TRACH DNA SPEC QL NAA+PROBE: NEGATIVE
N GONORRHOEA DNA SPEC QL NAA+PROBE: NEGATIVE

## 2023-08-15 ENCOUNTER — PATIENT OUTREACH (OUTPATIENT)
Dept: FAMILY MEDICINE CLINIC | Facility: CLINIC | Age: 23
End: 2023-08-15

## 2023-08-15 ENCOUNTER — HOSPITAL ENCOUNTER (OUTPATIENT)
Dept: RADIOLOGY | Facility: HOSPITAL | Age: 23
Discharge: HOME | End: 2023-08-15
Attending: FAMILY MEDICINE
Payer: COMMERCIAL

## 2023-08-15 DIAGNOSIS — R10.32 LLQ ABDOMINAL PAIN: ICD-10-CM

## 2023-08-15 PROCEDURE — G1004 CDSM NDSC: HCPCS

## 2023-08-15 PROCEDURE — 74177 CT ABD & PELVIS W/CONTRAST: CPT

## 2023-08-15 RX ADMIN — IOHEXOL 100 ML: 350 INJECTION, SOLUTION INTRAVENOUS at 12:50

## 2023-09-01 ENCOUNTER — CONSULT (OUTPATIENT)
Dept: NEPHROLOGY | Facility: CLINIC | Age: 23
End: 2023-09-01
Payer: COMMERCIAL

## 2023-09-01 VITALS — HEIGHT: 65 IN | BODY MASS INDEX: 26.52 KG/M2 | WEIGHT: 159.2 LBS

## 2023-09-01 DIAGNOSIS — R82.90 ABNORMAL URINE FINDINGS: Primary | ICD-10-CM

## 2023-09-01 DIAGNOSIS — F32.A ANXIETY AND DEPRESSION: ICD-10-CM

## 2023-09-01 DIAGNOSIS — F41.9 ANXIETY AND DEPRESSION: ICD-10-CM

## 2023-09-01 LAB
SL AMB  POCT GLUCOSE, UA: NORMAL
SL AMB LEUKOCYTE ESTERASE,UA: NORMAL
SL AMB POCT BILIRUBIN,UA: NORMAL
SL AMB POCT BLOOD,UA: NORMAL
SL AMB POCT CLARITY,UA: CLEAR
SL AMB POCT COLOR,UA: YELLOW
SL AMB POCT KETONES,UA: NORMAL
SL AMB POCT NITRITE,UA: NORMAL
SL AMB POCT PH,UA: 5
SL AMB POCT SPECIFIC GRAVITY,UA: 1.02
SL AMB POCT URINE PROTEIN: 15
SL AMB POCT UROBILINOGEN: 0.2

## 2023-09-01 PROCEDURE — 81002 URINALYSIS NONAUTO W/O SCOPE: CPT | Performed by: STUDENT IN AN ORGANIZED HEALTH CARE EDUCATION/TRAINING PROGRAM

## 2023-09-01 PROCEDURE — 99203 OFFICE O/P NEW LOW 30 MIN: CPT | Performed by: STUDENT IN AN ORGANIZED HEALTH CARE EDUCATION/TRAINING PROGRAM

## 2023-09-01 NOTE — PROGRESS NOTES
NEPHROLOGY OUTPATIENT CONSULTATION   Shobha Barba 25 y.o. female MRN: [id number]  Date: 9/4/2023  Reason for consultation:   Chief Complaint   Patient presents with   • Consult     Persistent proteinuria       ASSESSMENT AND PLAN:  25 y.o. Woman PMH of sciatica, depression who presents for initial consultation for abnormal UA    PLAN:    #Abnormal urine dipstick  · Urine dipstick: Trace proteins with elevated specific gravity   · Bag urinalysis UACR  · Slightly false positive proteins      #Volume status/hypertension:  • Volume:Euvolemic  • Blood pressure:Normotensive, always<140/90  • Recommend:  • low-sodium diet    #Depression  · On Lexapro    HISTORY OF PRESENT ILLNESS:  Shobha Barba is a 25 y.o.  female with medical issues of sciatica, depression who presents for initial consultation for abnormal UA    REVIEW OF SYSTEMS:    More than 10 point review of systems were obtained and discussed in length with the patient. Complete review of systems were negative / unremarkable except mentioned in the HPI section. Review of Systems - Psychological ROS: negative  Ophthalmic ROS: negative  ENT ROS: negative  Hematological and Lymphatic ROS: negative  Endocrine ROS: negative  Respiratory ROS: no cough, shortness of breath, or wheezing  Cardiovascular ROS: no chest pain or dyspnea on exertion  Gastrointestinal ROS: no abdominal pain, change in bowel habits, or black or bloody stools  Genito-Urinary ROS: no dysuria, trouble voiding, or hematuria  Musculoskeletal ROS: negative  Neurological ROS: no TIA or stroke symptoms  Dermatological ROS: negative     PHYSICAL EXAM:  Vitals:    09/01/23 0903   Weight: 72.2 kg (159 lb 3.2 oz)   Height: 5' 4.75" (1.645 m)     Body mass index is 26.7 kg/m².     Physical Exam   General:  no acute distress at this time  Skin:  No acute rash  Eyes:  No scleral icterus and noninjected  ENT:  mucous membranes moist  Neck:  no carotid bruits  Chest:  Clear to auscultation percussion, good respiratory effort, no use of accessory respiratory muscles  CVS:  Regular rate and rhythm without rub   Abdomen:  soft and nontender   Extremities: no significant lower extremity edema  Neuro:  No gross focality  Psych:  Alert , cooperative       PAST MEDICAL HISTORY:  Past Medical History:   Diagnosis Date   • Allergic        PAST SURGICAL HISTORY:  Past Surgical History:   Procedure Laterality Date   •  SECTION  2019       ALLERGIES:  No Known Allergies    SOCIAL HISTORY:  Social History     Substance and Sexual Activity   Alcohol Use Yes    Comment: social     Social History     Substance and Sexual Activity   Drug Use Never     Social History     Tobacco Use   Smoking Status Never   Smokeless Tobacco Current   Tobacco Comments    vape       FAMILY HISTORY:  Family History   Problem Relation Age of Onset   • Hypertension Mother    • Factor V Leiden deficiency Mother    • Diabetes Father    • Clotting disorder Mother        MEDICATIONS:    Current Outpatient Medications:   •  escitalopram (Lexapro) 10 mg tablet, Take 1 tablet (10 mg total) by mouth daily, Disp: 60 tablet, Rfl: 0  •  Multiple Vitamins-Minerals (HAIR SKIN AND NAILS FORMULA PO), Take by mouth in the morning, Disp: , Rfl:   •  multivitamin (THERAGRAN) TABS, Take 1 tablet by mouth daily, Disp: , Rfl:     Lab Results:   Results for orders placed or performed in visit on 23   POCT urine dip   Result Value Ref Range    LEUKOCYTE ESTERASE,UA -     NITRITE,UA -     [facility] POCT UROBILINOGEN 0.2     POCT URINE PROTEIN 15      PH,UA 5.0     BLOOD,UA Trace     SPECIFIC GRAVITY,UA 1.025     KETONES,UA -     BILIRUBIN,UA -     GLUCOSE, UA -      COLOR,UA yellow     CLARITY,UA clear        Portions of the record may have been created with voice recognition software. Occasional wrong word or "sound a like" substitutions may have occurred due to the inherent limitations of voice recognition software.  Read the chart carefully and recognize, using context, where substitutions have occurred.

## 2023-09-01 NOTE — PATIENT INSTRUCTIONS
Thank you for coming to your visit today.  As we discussed you kidney function is normal. Please follow the recommendations below       Recommend low sodium (salt) food    Repeat urine test to rule out proteinuria    Try to exercise at least 30 minutes 3 days a week to begin with with an ultimate goal of 5 days a week for at least 30 minutes    Next Visit in 4 months with results   If you need to see us earlier we can change the appointment for you      Km Santacruz MD  Nephrology Attending

## 2023-09-04 PROBLEM — R80.1 PERSISTENT PROTEINURIA: Status: ACTIVE | Noted: 2023-09-04

## 2023-09-04 PROBLEM — R82.90 ABNORMAL URINE FINDINGS: Status: ACTIVE | Noted: 2023-09-04

## 2023-09-04 PROBLEM — R80.1 PERSISTENT PROTEINURIA: Status: RESOLVED | Noted: 2023-09-04 | Resolved: 2023-09-04

## 2023-09-21 ENCOUNTER — OFFICE VISIT (OUTPATIENT)
Dept: FAMILY MEDICINE CLINIC | Facility: CLINIC | Age: 23
End: 2023-09-21
Payer: COMMERCIAL

## 2023-09-21 VITALS
RESPIRATION RATE: 16 BRPM | OXYGEN SATURATION: 100 % | TEMPERATURE: 98 F | HEIGHT: 65 IN | HEART RATE: 90 BPM | BODY MASS INDEX: 25.83 KG/M2 | SYSTOLIC BLOOD PRESSURE: 112 MMHG | WEIGHT: 155 LBS | DIASTOLIC BLOOD PRESSURE: 80 MMHG

## 2023-09-21 DIAGNOSIS — F41.9 ANXIETY AND DEPRESSION: ICD-10-CM

## 2023-09-21 DIAGNOSIS — F32.A ANXIETY AND DEPRESSION: ICD-10-CM

## 2023-09-21 PROCEDURE — 99213 OFFICE O/P EST LOW 20 MIN: CPT | Performed by: FAMILY MEDICINE

## 2023-09-21 RX ORDER — ESCITALOPRAM OXALATE 10 MG/1
10 TABLET ORAL DAILY
Qty: 90 TABLET | Refills: 1 | Status: SHIPPED | OUTPATIENT
Start: 2023-09-21

## 2023-09-21 NOTE — PROGRESS NOTES
Name: Tray Sorto      : 2000      MRN: [id number]  Encounter Provider: Sera Menezes DO  Encounter Date: 2023   Encounter department: [address] [place] [address]. Anxiety and depression  Assessment & Plan:  Doing well on Lexapro, would like to continue. Orders:  -     escitalopram (Lexapro) 10 mg tablet; Take 1 tablet (10 mg total) by mouth daily           Subjective      HPI   Presents for follow up after starting Lexapro 10 mg. Feels a bit better. Stress levels have improved as well. Having some nausea but tolerable. Talked to [address] psychology but not sure if she really needs it any more. Boyfriend was going through a rough patch with losing his job, she was taking on everyone else's stress. Regarding abdominal pain, much better, less sharp shooting pain and CT was normal.    Followed up with nephrology regarding proteinuria, noted to have slightly false positive proteins, no follow up recommended. Review of Systems as in HPI    Current Outpatient Medications on File Prior to Visit   Medication Sig   • Multiple Vitamins-Minerals (HAIR SKIN AND NAILS FORMULA PO) Take by mouth in the morning   • multivitamin (THERAGRAN) TABS Take 1 tablet by mouth daily   • [DISCONTINUED] escitalopram (Lexapro) 10 mg tablet Take 1 tablet (10 mg total) by mouth daily       Objective     /80   Pulse 90   Temp 98 °F (36.7 °C) (Temporal)   Resp 16   Ht 5' 4.75" (1.645 m)   Wt 70.3 kg (155 lb)   LMP 2023 (Exact Date)   SpO2 100%   BMI 25.99 kg/m²     Physical Exam  Vitals reviewed. Constitutional:       Appearance: Normal appearance. Cardiovascular:      Rate and Rhythm: Normal rate. Pulmonary:      Effort: Pulmonary effort is normal.   Abdominal:      General: Abdomen is flat. Skin:     General: Skin is warm and dry. Neurological:      Mental Status: She is alert.    Psychiatric:         Mood and Affect: Mood normal.         Behavior: Behavior normal. Tere Lively, DO

## 2023-09-22 ENCOUNTER — TELEPHONE (OUTPATIENT)
Dept: ADMINISTRATIVE | Facility: OTHER | Age: 23
End: 2023-09-22

## 2023-09-22 NOTE — TELEPHONE ENCOUNTER
Upon review of the In Basket request we were able to locate, review, and update the patient chart as requested for Pap Smear (HPV) aka Cervical Cancer Screening. Any additional questions or concerns should be emailed to the Practice Liaisons via the appropriate education email address, please do not reply via In Basket.     Thank you  Danya Jiménez MA

## 2023-09-22 NOTE — TELEPHONE ENCOUNTER
----- Message from Daniel Fitch sent at 9/21/2023  8:08 AM EDT -----  Regarding: Care Gap Request  09/21/23 8:08 AM    Hello, our patient attached above has had Pap Smear (HPV) aka Cervical Cancer Screening completed/performed. Please assist in updating the patient chart by pulling the Care Everywhere (CE) document. The date of service is 7/4/2022 at [facility].      Thank you,  Daniel Fitch [facility]

## 2024-01-03 ENCOUNTER — TELEPHONE (OUTPATIENT)
Dept: NEPHROLOGY | Facility: CLINIC | Age: 24
End: 2024-01-03

## 2024-01-03 NOTE — TELEPHONE ENCOUNTER
Left voicemail for the patient reminding to please complete labwork prior to 1/11 appointment with Dr. Reyes. Advised patient to call back with any questions or concerns.

## 2024-01-11 ENCOUNTER — TELEPHONE (OUTPATIENT)
Dept: NEPHROLOGY | Facility: CLINIC | Age: 24
End: 2024-01-11

## 2024-01-24 ENCOUNTER — OFFICE VISIT (OUTPATIENT)
Dept: FAMILY MEDICINE CLINIC | Facility: CLINIC | Age: 24
End: 2024-01-24
Payer: COMMERCIAL

## 2024-01-24 VITALS
HEART RATE: 104 BPM | BODY MASS INDEX: 26.99 KG/M2 | WEIGHT: 162 LBS | OXYGEN SATURATION: 100 % | HEIGHT: 65 IN | TEMPERATURE: 98 F | DIASTOLIC BLOOD PRESSURE: 82 MMHG | SYSTOLIC BLOOD PRESSURE: 116 MMHG | RESPIRATION RATE: 16 BRPM

## 2024-01-24 DIAGNOSIS — Z00.00 ANNUAL PHYSICAL EXAM: Primary | ICD-10-CM

## 2024-01-24 DIAGNOSIS — K52.9 GASTROENTERITIS: ICD-10-CM

## 2024-01-24 PROCEDURE — 99395 PREV VISIT EST AGE 18-39: CPT | Performed by: FAMILY MEDICINE

## 2024-01-24 PROCEDURE — 99213 OFFICE O/P EST LOW 20 MIN: CPT | Performed by: FAMILY MEDICINE

## 2024-01-24 NOTE — PROGRESS NOTES
ADULT ANNUAL PHYSICAL  [facility]    NAME: Meghan Yanez  AGE: 23 y.o. SEX: female  : 2000     DATE: 2024     Assessment and Plan:     Problem List Items Addressed This Visit     Gastroenteritis     Likely viral. Recommend symptom directed treatment and supportive care. Ensure adequate hydration with electrolyte rich fluids and gentle diet as tolerated. Follow up if not improved.        Other Visit Diagnoses     Annual physical exam    -  Primary            Immunizations and preventive care screenings were discussed with patient today. Appropriate education was printed on patient's after visit summary.    Counseling:  Alcohol/drug use: discussed moderation in alcohol intake, the recommendations for healthy alcohol use, and avoidance of illicit drug use.  Dental Health: discussed importance of regular tooth brushing, flossing, and dental visits.  Injury prevention: discussed safety/seat belts, safety helmets, smoke detectors, carbon dioxide detectors, and smoking near bedding or upholstery.  Sexual health: discussed sexually transmitted diseases, partner selection, use of condoms, avoidance of unintended pregnancy, and contraceptive alternatives.  Exercise: the importance of regular exercise/physical activity was discussed. Recommend exercise 3-5 times per week for at least 30 minutes.          No follow-ups on file.     Chief Complaint:     Chief Complaint   Patient presents with   • Annual Exam   • Cold Like Symptoms     x1 day congestion/ sore throat & swelling/ headache/ dizziness  No covid test taken      History of Present Illness:     Adult Annual Physical   Patient here for a comprehensive physical exam. The patient reports  recent illness .    Woke up yesterday with headache, sore throat, abdominal pain, vomiting, diarrhea, had a fever as well. Today still having sore throat and headache. Diarrhea is improved today, as is fever. When  standing she gets very dizzy, resolves after a few minutes. Drank water and ginger ale yesterday, not as much as her baseline hydration.       Diet and Physical Activity  Diet/Nutrition: well balanced diet.   Exercise: walking.      Depression Screening  PHQ-2/9 Depression Screening    Little interest or pleasure in doing things: 0 - not at all  Feeling down, depressed, or hopeless: 0 - not at all  Trouble falling or staying asleep, or sleeping too much: 0 - not at all  Feeling tired or having little energy: 0 - not at all  Poor appetite or overeatin - not at all  Feeling bad about yourself - or that you are a failure or have let yourself or your family down: 0 - not at all  Trouble concentrating on things, such as reading the newspaper or watching television: 0 - not at all  Moving or speaking so slowly that other people could have noticed. Or the opposite - being so fidgety or restless that you have been moving around a lot more than usual: 0 - not at all  Thoughts that you would be better off dead, or of hurting yourself in some way: 0 - not at all  PHQ-9 Score: 0  PHQ-9 Interpretation: No or Minimal depression       General Health  Sleep: sleeps well.   Hearing: normal - bilateral.  Vision: no vision problems and goes for regular eye exams.   Dental: regular dental visits.       /GYN Health  Follows with gynecology? no   Last menstrual period: regular no issues  Contraceptive method: barrier methods, male partner had vasectomy.  History of STDs?: yes, chlamydia 4 years, treated, resolved    Advanced Care Planning  Do you have an advanced directive? no  Do you have a durable medical power of ? no     Review of Systems:     Review of Systems   Past Medical History:     Past Medical History:   Diagnosis Date   • Allergic       Past Surgical History:     Past Surgical History:   Procedure Laterality Date   •  SECTION  2019      Social History:     Social History     Socioeconomic History   •  "Marital status: /Civil Union     Spouse name: None   • Number of children: None   • Years of education: None   • Highest education level: None   Occupational History   • None   Tobacco Use   • Smoking status: Never   • Smokeless tobacco: Current   • Tobacco comments:     vape   Vaping Use   • Vaping status: Never Used   Substance and Sexual Activity   • Alcohol use: Yes     Comment: social   • Drug use: Never   • Sexual activity: Yes     Partners: Male   Other Topics Concern   • None   Social History Narrative    ** Merged History Encounter **           One child 9 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 2019  Stay at home mom     Social Determinants of Health     Financial Resource Strain: Not on file   Food Insecurity: Not on file   Transportation Needs: Not on file   Physical Activity: Not on file   Stress: Not on file   Social Connections: Not on file   Intimate Partner Violence: Not on file   Housing Stability: Not on file      Family History:     Family History   Problem Relation Age of Onset   • Hypertension Mother    • Factor V Leiden deficiency Mother    • Diabetes Father    • Clotting disorder Mother       Current Medications:     Current Outpatient Medications   Medication Sig Dispense Refill   • escitalopram (Lexapro) 10 mg tablet Take 1 tablet (10 mg total) by mouth daily 90 tablet 1   • multivitamin (THERAGRAN) TABS Take 1 tablet by mouth daily     • Multiple Vitamins-Minerals (HAIR SKIN AND NAILS FORMULA PO) Take by mouth in the morning (Patient not taking: Reported on 1/24/2024)       No current facility-administered medications for this visit.      Allergies:     No Known Allergies   Physical Exam:     /82   Pulse 104   Temp 98 °F (36.7 °C) (Temporal)   Resp 16   Ht 5' 4.75\" (1.645 m)   Wt 73.5 kg (162 lb)   LMP 01/17/2024 (Exact Date)   SpO2 100%   BMI 27.17 kg/m²     Physical Exam  Vitals reviewed.   Constitutional:       Appearance: Normal appearance.   Eyes:      Extraocular Movements: " Extraocular movements intact.      Conjunctiva/sclera: Conjunctivae normal.   Cardiovascular:      Rate and Rhythm: Normal rate and regular rhythm.      Heart sounds: Normal heart sounds.   Pulmonary:      Effort: Pulmonary effort is normal.      Breath sounds: Normal breath sounds.   Abdominal:      General: Abdomen is flat. There is no distension.      Tenderness: There is no abdominal tenderness.   Skin:     General: Skin is warm and dry.      Capillary Refill: Capillary refill takes less than 2 seconds.   Neurological:      Mental Status: She is alert.   Psychiatric:         Mood and Affect: Mood normal.         Behavior: Behavior normal.          Lisa Bauman [facility]

## 2024-01-24 NOTE — ASSESSMENT & PLAN NOTE
Likely viral. Recommend symptom directed treatment and supportive care. Ensure adequate hydration with electrolyte rich fluids and gentle diet as tolerated. Follow up if not improved.

## 2024-02-15 ENCOUNTER — OFFICE VISIT (OUTPATIENT)
Dept: FAMILY MEDICINE CLINIC | Facility: CLINIC | Age: 24
End: 2024-02-15
Payer: COMMERCIAL

## 2024-02-15 VITALS
BODY MASS INDEX: 27.68 KG/M2 | RESPIRATION RATE: 18 BRPM | TEMPERATURE: 98 F | WEIGHT: 162.13 LBS | OXYGEN SATURATION: 98 % | SYSTOLIC BLOOD PRESSURE: 94 MMHG | DIASTOLIC BLOOD PRESSURE: 70 MMHG | HEIGHT: 64 IN | HEART RATE: 100 BPM

## 2024-02-15 DIAGNOSIS — J02.9 PHARYNGITIS, UNSPECIFIED ETIOLOGY: Primary | ICD-10-CM

## 2024-02-15 PROCEDURE — 99213 OFFICE O/P EST LOW 20 MIN: CPT | Performed by: FAMILY MEDICINE

## 2024-02-15 RX ORDER — CEFDINIR 300 MG/1
300 CAPSULE ORAL 2 TIMES DAILY
Qty: 14 CAPSULE | Refills: 0 | Status: SHIPPED | OUTPATIENT
Start: 2024-02-15 | End: 2024-02-22

## 2024-02-15 NOTE — PROGRESS NOTES
FAMILY PRACTICE OFFICE VISIT       NAME: Meghan Yanez  AGE: 23 y.o. SEX: female       : 2000        MRN: [id number]    DATE: 2024  TIME: 6:10 AM    Assessment and Plan     Problem List Items Addressed This Visit       Pharyngitis - Primary     Pharyngitis.  Patient given prescription for Omnicef 300 mg to use 1 twice daily for 7 days.  Patient will call if symptoms persist after medication completed         Relevant Medications    cefdinir (OMNICEF) 300 mg capsule           Chief Complaint     Chief Complaint   Patient presents with    Nasal Congestion       History of Present Illness     Patient in the office with approximate 5-day history of significant sore throat, nasal congestion, coughing, and bilateral ear pressure.  She denies any documented fevers.  Patient does vape on a regular basis.  She states her mother was recently diagnosed and treated for sinus infection        Review of Systems   Review of Systems   Constitutional:  Negative for fatigue and fever.   HENT:  Positive for congestion, ear pain and sore throat. Negative for hearing loss.    Respiratory:  Positive for cough.    Cardiovascular: Negative.    Gastrointestinal: Negative.    Musculoskeletal: Negative.    Skin: Negative.        Active Problem List     Patient Active Problem List   Diagnosis    Cervical radiculopathy    Family history of factor V Leiden mutation    Anxiety and depression    Abnormal urine findings    Gastroenteritis    Pharyngitis       Past Medical History:  Past Medical History:   Diagnosis Date    Allergic        Past Surgical History:  Past Surgical History:   Procedure Laterality Date     SECTION  2019       Family History:  Family History   Problem Relation Age of Onset    Hypertension Mother     Factor V Leiden deficiency Mother     Diabetes Father     Clotting disorder Mother        Social History:  Social History     Socioeconomic History    Marital status: /Civil Union     Spouse name:  Not on file    Number of children: Not on file    Years of education: Not on file    Highest education level: Not on file   Occupational History    Not on file   Tobacco Use    Smoking status: Never    Smokeless tobacco: Current    Tobacco comments:     vape   Vaping Use    Vaping status: Never Used   Substance and Sexual Activity    Alcohol use: Yes     Comment: social    Drug use: Never    Sexual activity: Yes     Partners: Male   Other Topics Concern    Not on file   Social History Narrative    ** Merged History Encounter **           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 9 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 2019  Stay at home mom     Social Determinants of Health     Financial Resource Strain: Not on file   Food Insecurity: Not on file   Transportation Needs: Not on file   Physical Activity: Not on file   Stress: Not on file   Social Connections: Not on file   Intimate Partner Violence: Not on file   Housing Stability: Not on file       Objective     Vitals:    02/15/24 1635   BP: 94/70   Pulse: 100   Resp: 18   Temp: 98 °F (36.7 °C)   SpO2: 98%     Wt Readings from Last 3 Encounters:   02/15/24 73.5 kg (162 lb 2 oz)   01/24/24 73.5 kg (162 lb)   09/21/23 70.3 kg (155 lb)       Physical Exam  Constitutional:       General: She is not in acute distress.     Appearance: Normal appearance. She is not ill-appearing.   HENT:      Right Ear: Tympanic membrane, ear canal and external ear normal. There is no impacted cerumen.      Left Ear: Tympanic membrane, ear canal and external ear normal. There is no impacted cerumen.      Nose: Nose normal. No congestion or rhinorrhea.      Mouth/Throat:      Mouth: Mucous membranes are moist.      Pharynx: No oropharyngeal exudate or posterior oropharyngeal erythema.   Eyes:      General:         Right eye: No discharge.         Left eye: No discharge.      Extraocular Movements: Extraocular movements intact.      Conjunctiva/sclera: Conjunctivae normal.      Pupils: Pupils are equal, round, and reactive to light.  "  Cardiovascular:      Rate and Rhythm: Normal rate and regular rhythm.      Heart sounds: Normal heart sounds. No murmur heard.  Pulmonary:      Effort: Pulmonary effort is normal. No respiratory distress.      Breath sounds: Normal breath sounds. No wheezing or rhonchi.   Lymphadenopathy:      Cervical: Cervical adenopathy present.   Neurological:      Mental Status: She is alert.         Pertinent Laboratory/Diagnostic Studies:  Lab Results   Component Value Date    BUN 11 08/10/2023    CREATININE 0.76 08/10/2023    CALCIUM 9.1 08/10/2023    K 3.8 08/10/2023    CO2 25 08/10/2023     (H) 08/10/2023     Lab Results   Component Value Date    ALT 18 06/27/2023    AST 11 06/27/2023    ALKPHOS 67 06/27/2023       Lab Results   Component Value Date    WBC 6.97 08/10/2023    HGB 12.5 08/10/2023    HCT 38.7 08/10/2023    MCV 92 08/10/2023     08/10/2023       No results found for: \"TSH\"    No results found for: \"CHOL\"  No results found for: \"TRIG\"  No results found for: \"HDL\"  No results found for: \"LDLCALC\"  No results found for: \"HGBA1C\"    Results for orders placed or performed in visit on 09/01/23   POCT urine dip   Result Value Ref Range    LEUKOCYTE ESTERASE,UA -     NITRITE,UA -     SL AMB POCT UROBILINOGEN 0.2     POCT URINE PROTEIN 15      PH,UA 5.0     BLOOD,UA Trace     SPECIFIC GRAVITY,UA 1.025     KETONES,UA -     BILIRUBIN,UA -     GLUCOSE, UA -      COLOR,UA yellow     CLARITY,UA clear        No orders of the defined types were placed in this encounter.      ALLERGIES:  No Known Allergies    Current Medications     Current Outpatient Medications   Medication Sig Dispense Refill    cefdinir (OMNICEF) 300 mg capsule Take 1 capsule (300 mg total) by mouth 2 (two) times a day for 7 days 14 capsule 0    escitalopram (Lexapro) 10 mg tablet Take 1 tablet (10 mg total) by mouth daily 90 tablet 1    multivitamin (THERAGRAN) TABS Take 1 tablet by mouth daily      Multiple Vitamins-Minerals (HAIR SKIN AND " NAILS FORMULA PO) Take by mouth in the morning (Patient not taking: Reported on 1/24/2024)       No current facility-administered medications for this visit.         Health Maintenance     Health Maintenance   Topic Date Due    COVID-19 Vaccine (1 - 2023-24 season) 04/24/2024 (Originally 9/1/2023)    Influenza Vaccine (1) 06/30/2024 (Originally 9/1/2023)    HPV Vaccine (1 - 3-dose series) 09/21/2024 (Originally 9/18/2015)    Chlamydia Screening  08/10/2024    Depression Screening  01/24/2025    Annual Physical  01/24/2025    Cervical Cancer Screening  07/14/2025    DTaP,Tdap,and Td Vaccines (2 - Td or Tdap) 12/19/2028    Zoster Vaccine (1 of 2) 09/18/2050    HIV Screening  Completed    Hepatitis C Screening  Completed    Pneumococcal Vaccine: Pediatrics (0 to 5 Years) and At-Risk Patients (6 to 64 Years)  Aged Out    HIB Vaccine  Aged Out    IPV Vaccine  Aged Out    Hepatitis A Vaccine  Aged Out    Meningococcal ACWY Vaccine  Aged Out     Immunization History   Administered Date(s) Administered    INFLUENZA 10/16/2018    Tdap 12/19/2018       Venkatesh Elliott MD

## 2024-02-16 NOTE — ASSESSMENT & PLAN NOTE
Pharyngitis.  Patient given prescription for Omnicef 300 mg to use 1 twice daily for 7 days.  Patient will call if symptoms persist after medication completed

## 2024-02-21 PROBLEM — K52.9 GASTROENTERITIS: Status: RESOLVED | Noted: 2024-01-24 | Resolved: 2024-02-21

## 2024-08-20 ENCOUNTER — APPOINTMENT (OUTPATIENT)
Dept: RADIOLOGY | Facility: CLINIC | Age: 24
End: 2024-08-20
Payer: COMMERCIAL

## 2024-08-20 ENCOUNTER — OFFICE VISIT (OUTPATIENT)
Dept: PODIATRY | Facility: CLINIC | Age: 24
End: 2024-08-20
Payer: COMMERCIAL

## 2024-08-20 VITALS
SYSTOLIC BLOOD PRESSURE: 127 MMHG | HEIGHT: 64 IN | DIASTOLIC BLOOD PRESSURE: 98 MMHG | WEIGHT: 166 LBS | BODY MASS INDEX: 28.34 KG/M2 | HEART RATE: 85 BPM

## 2024-08-20 DIAGNOSIS — M20.11 VALGUS DEFORMITY OF BOTH GREAT TOES: Primary | ICD-10-CM

## 2024-08-20 DIAGNOSIS — M79.671 PAIN OF RIGHT FOOT: ICD-10-CM

## 2024-08-20 DIAGNOSIS — M20.12 VALGUS DEFORMITY OF BOTH GREAT TOES: ICD-10-CM

## 2024-08-20 DIAGNOSIS — M20.12 VALGUS DEFORMITY OF BOTH GREAT TOES: Primary | ICD-10-CM

## 2024-08-20 DIAGNOSIS — M20.11 VALGUS DEFORMITY OF BOTH GREAT TOES: ICD-10-CM

## 2024-08-20 DIAGNOSIS — M79.672 PAIN IN LEFT FOOT: ICD-10-CM

## 2024-08-20 PROCEDURE — 99204 OFFICE O/P NEW MOD 45 MIN: CPT | Performed by: PODIATRIST

## 2024-08-20 PROCEDURE — 73630 X-RAY EXAM OF FOOT: CPT

## 2024-08-20 RX ORDER — LEVONORGESTREL 52 MG/1
1 INTRAUTERINE DEVICE INTRAUTERINE
COMMUNITY
Start: 2024-06-24

## 2024-08-20 NOTE — PROGRESS NOTES
Ambulatory Visit  Name: Meghan Yanez      : 2000      MRN: [id number]  Encounter Provider: Jorge L Lechuga DPM  Encounter Date: 2024   Encounter department: [facility] [place]    Assessment & Plan   1. Valgus deformity of both great toes  -     XR foot 3+ vw left; Future; Expected date: 2024  -     XR foot 3+ vw right; Future; Expected date: 2024  2. Pain of right foot  -     XR foot 3+ vw right; Future; Expected date: 2024  3. Pain in left foot  -     XR foot 3+ vw left; Future; Expected date: 2024    In order to adequately evaluate patient's pathology a complete set of weightbearing x-rays are needed for both feet.  Left foot deformity clinically is larger but the right foot is more painful.  Rx x-ray bilateral feet bearing complete  X-rays personally reviewed: Left foot IM angle 14.5 degrees with sesamoid position #4-5, no fracture dislocation or severe degenerative arthritis noted.  X-rays personally reviewed: Right foot IM angle 12.8 degrees with sesamoid position #3-4, no fracture or dislocation, no severe degenerative arthritis noted.  Treatment options reviewed conservative versus surgical.  Patient is contemplating surgical correction.  All risks alternatives and possible complications of surgical correction of these types of deformities were reviewed in detail.  Typical perioperative course reviewed for a Lapidus and a chevron bunionectomy.  Patient advised to follow-up when she is ready to make a decision on proceeding with the surgical correction.      History of Present Illness     Meghan Yanez is a 23 y.o. female who presents painful bilateral bunion deformities which for years were without pain but now over the past 8 months she has had increased pain and discomfort that is starting to limit her activities.    Review of Systems   Constitutional: Negative.    HENT: Negative.     Eyes: Negative.    Respiratory: Negative.     Cardiovascular: Negative.  "   Gastrointestinal: Negative.    Endocrine: Negative.    Genitourinary: Negative.    Musculoskeletal:  Positive for arthralgias.        Bilateral bunion deformity   Skin: Negative.    Allergic/Immunologic: Negative.    Neurological: Negative.    Hematological: Negative.    Psychiatric/Behavioral: Negative.       Past Medical History   Past Medical History:   Diagnosis Date   • Allergic      Past Surgical History:   Procedure Laterality Date   •  SECTION  2019     Family History   Problem Relation Age of Onset   • Hypertension Mother    • Factor V Leiden deficiency Mother    • Diabetes Father    • Clotting disorder Mother      Current Outpatient Medications on File Prior to Visit   Medication Sig Dispense Refill   • Levonorgestrel (Mirena, 52 MG,) 20 MCG/DAY IUD 1 each by Intrauterine route     • multivitamin (THERAGRAN) TABS Take 1 tablet by mouth daily     • escitalopram (Lexapro) 10 mg tablet Take 1 tablet (10 mg total) by mouth daily (Patient not taking: Reported on 2024) 90 tablet 1   • Multiple Vitamins-Minerals (HAIR SKIN AND NAILS FORMULA PO) Take by mouth in the morning (Patient not taking: Reported on 2024)       No current facility-administered medications on file prior to visit.   No Known Allergies   Objective     /98 (BP Location: Left arm, Patient Position: Sitting, Cuff Size: Adult)   Pulse 85   Ht 5' 4\" (1.626 m) Comment: stated  Wt 75.3 kg (166 lb)   BMI 28.49 kg/m²     Physical Exam  Vitals and nursing note reviewed.   Constitutional:       General: She is not in acute distress.     Appearance: Normal appearance. She is well-developed.   HENT:      Head: Normocephalic and atraumatic.      Nose: Nose normal.   Eyes:      Conjunctiva/sclera: Conjunctivae normal.   Cardiovascular:      Rate and Rhythm: Normal rate and regular rhythm.      Pulses:           Dorsalis pedis pulses are 2+ on the right side and 2+ on the left side.        Posterior tibial pulses are 2+ on the " right side and 2+ on the left side.   Pulmonary:      Effort: Pulmonary effort is normal. No respiratory distress.   Musculoskeletal:         General: Swelling, tenderness and deformity present.      Cervical back: Neck supple.      Right foot: Deformity and bunion present.      Left foot: Deformity and bunion present.      Comments:   Moderate hallux valgus deformity bilateral (L>R), right foot is more painful than the left foot.  Approximate 25 degrees range of motion available in either right or left foot.  Left foot first ray is somewhat more hypermobile than the right foot.   Feet:      Right foot:      Protective Sensation: 10 sites tested.  10 sites sensed.      Skin integrity: Skin integrity normal.      Left foot:      Protective Sensation: 10 sites tested.  10 sites sensed.      Skin integrity: Skin integrity normal.   Skin:     General: Skin is warm and dry.      Capillary Refill: Capillary refill takes 2 to 3 seconds.   Neurological:      General: No focal deficit present.      Mental Status: She is alert.   Psychiatric:         Mood and Affect: Mood normal.       Administrative Statements

## 2025-01-08 ENCOUNTER — OFFICE VISIT (OUTPATIENT)
Dept: FAMILY MEDICINE CLINIC | Facility: CLINIC | Age: 25
End: 2025-01-08
Payer: COMMERCIAL

## 2025-01-08 VITALS
RESPIRATION RATE: 16 BRPM | DIASTOLIC BLOOD PRESSURE: 78 MMHG | HEART RATE: 90 BPM | WEIGHT: 176.8 LBS | HEIGHT: 64 IN | TEMPERATURE: 98.2 F | BODY MASS INDEX: 30.19 KG/M2 | SYSTOLIC BLOOD PRESSURE: 128 MMHG | OXYGEN SATURATION: 100 %

## 2025-01-08 DIAGNOSIS — H61.21 IMPACTED CERUMEN, RIGHT EAR: Primary | ICD-10-CM

## 2025-01-08 PROCEDURE — 99213 OFFICE O/P EST LOW 20 MIN: CPT | Performed by: FAMILY MEDICINE

## 2025-01-08 PROCEDURE — 69209 REMOVE IMPACTED EAR WAX UNI: CPT | Performed by: FAMILY MEDICINE

## 2025-01-08 NOTE — PROGRESS NOTES
"Name: Meghan Yanez      : 2000      MRN: [id number]  Encounter Provider: Lisa Bauman DO  Encounter Date: 2025   Encounter department: [facility]  :  Assessment & Plan  Impacted cerumen, right ear  Tolerated irrigation well with improvement in hearing.  Can use Debrox for maintenance, avoid Q-tips.  Follow-up if muffled hearing is recurrent.  Orders:  •  Ear cerumen removal          Depression Screening and Follow-up Plan: Patient was screened for depression during today's encounter. They screened negative with a PHQ-9 score of 0.      History of Present Illness     Earache   There is pain in the right ear. The current episode started 1 to 4 weeks ago. The problem occurs constantly. The problem has been gradually worsening. There has been no fever. The pain is at a severity of 6/10. Associated symptoms include coughing, ear discharge, headaches, hearing loss, neck pain, rhinorrhea and a sore throat. Pertinent negatives include no abdominal pain or diarrhea.     Congestion for 10 days, a lot of drainage. Fluid coming out of the R ear, today is clogged, muffled hearing. A lot of neck tension. Otherwise respiratory symptoms are improving.    Review of Systems   HENT:  Positive for ear discharge, ear pain, hearing loss, rhinorrhea and sore throat.    Respiratory:  Positive for cough.    Gastrointestinal:  Negative for abdominal pain and diarrhea.   Musculoskeletal:  Positive for neck pain.   Neurological:  Positive for headaches.       Objective   /78 (BP Location: Left arm, Patient Position: Sitting, Cuff Size: Standard)   Pulse 90   Temp 98.2 °F (36.8 °C) (Temporal)   Resp 16   Ht 5' 4\" (1.626 m)   Wt 80.2 kg (176 lb 12.8 oz)   SpO2 100%   BMI 30.35 kg/m²      Physical Exam  Constitutional:       Appearance: Normal appearance.   HENT:      Right Ear: Tympanic membrane, ear canal and external ear normal. There is impacted cerumen.      Left Ear: Tympanic membrane, " ear canal and external ear normal.      Nose: Nose normal.      Mouth/Throat:      Mouth: Mucous membranes are moist.      Pharynx: Posterior oropharyngeal erythema present.   Eyes:      Extraocular Movements: Extraocular movements intact.      Conjunctiva/sclera: Conjunctivae normal.   Cardiovascular:      Rate and Rhythm: Normal rate.   Pulmonary:      Effort: Pulmonary effort is normal.   Abdominal:      General: Abdomen is flat.   Skin:     General: Skin is warm and dry.   Neurological:      Mental Status: She is alert.   Psychiatric:         Mood and Affect: Mood normal.         Behavior: Behavior normal.       Ear cerumen removal    Date/Time: 1/8/2025 2:20 PM    Performed by: Lisa Bauman DO  Authorized by: Lisa Bauman DO    Procedure details:     Location:  R ear    Procedure type: irrigation only    Post-procedure details:     Complication:  None    Hearing quality:  Normal    Patient tolerance of procedure:  Tolerated well, no immediate complications

## 2025-01-16 ENCOUNTER — OFFICE VISIT (OUTPATIENT)
Dept: FAMILY MEDICINE CLINIC | Facility: CLINIC | Age: 25
End: 2025-01-16
Payer: COMMERCIAL

## 2025-01-16 VITALS
WEIGHT: 172 LBS | BODY MASS INDEX: 29.37 KG/M2 | RESPIRATION RATE: 16 BRPM | DIASTOLIC BLOOD PRESSURE: 80 MMHG | HEART RATE: 108 BPM | TEMPERATURE: 98.9 F | SYSTOLIC BLOOD PRESSURE: 110 MMHG | HEIGHT: 64 IN | OXYGEN SATURATION: 97 %

## 2025-01-16 DIAGNOSIS — K52.9 GASTROENTERITIS: Primary | ICD-10-CM

## 2025-01-16 PROCEDURE — 99213 OFFICE O/P EST LOW 20 MIN: CPT | Performed by: FAMILY MEDICINE

## 2025-01-16 RX ORDER — ONDANSETRON 4 MG/1
4 TABLET, ORALLY DISINTEGRATING ORAL EVERY 6 HOURS PRN
Qty: 20 TABLET | Refills: 0 | Status: SHIPPED | OUTPATIENT
Start: 2025-01-16

## 2025-01-16 NOTE — PROGRESS NOTES
"Name: Meghan Yanez      : 2000      MRN: [id number]  Encounter Provider: Lisa Bauman DO  Encounter Date: 2025   Encounter department: [facility]  :  Assessment & Plan  Gastroenteritis  Symptoms resolving. Still with nausea today. Start Zofran prn. Work note provided. Continue adequate hydration. Gentle diet as tolerated. Follow up if worsening or not improved.  Orders:  •  ondansetron (ZOFRAN-ODT) 4 mg disintegrating tablet; Take 1 tablet (4 mg total) by mouth every 6 (six) hours as needed for nausea or vomiting           History of Present Illness     HPI  Yesterday went to work, was having abdominal pain. Developed nausea, ate some crackers, then had nausea, vomitiing, diarrhea, fever all day. Today fever is improved. Dizzy if she stands for too long. Still nauseous but no more vomiting today.     Review of Systems    Objective   /80   Pulse (!) 108   Temp 98.9 °F (37.2 °C) (Tympanic)   Resp 16   Ht 5' 4\" (1.626 m)   Wt 78 kg (172 lb)   LMP 2024 (Exact Date)   SpO2 97%   BMI 29.52 kg/m²      Physical Exam  Vitals reviewed.   Constitutional:       Appearance: Normal appearance.   Eyes:      Extraocular Movements: Extraocular movements intact.      Conjunctiva/sclera: Conjunctivae normal.   Cardiovascular:      Rate and Rhythm: Normal rate and regular rhythm.      Heart sounds: Normal heart sounds.   Pulmonary:      Effort: Pulmonary effort is normal.      Breath sounds: Normal breath sounds.   Abdominal:      General: Abdomen is flat. There is no distension.      Palpations: Abdomen is soft.      Tenderness: There is abdominal tenderness (mild epigastric).   Skin:     General: Skin is warm and dry.      Capillary Refill: Capillary refill takes less than 2 seconds.   Neurological:      Mental Status: She is alert.   Psychiatric:         Mood and Affect: Mood normal.         Behavior: Behavior normal.         "

## 2025-01-16 NOTE — LETTER
January 16, 2025     Patient: Meghan Yanez  YOB: 2000  Date of Visit: 1/16/2025      To Whom it May Concern:    Meghan Yanez is under my professional care. Meghan was seen in my office on 1/16/2025. Meghan may return to work on 1/20/25. Please excuse her absence due to illness 1/15-1/19 .    If you have any questions or concerns, please don't hesitate to call.         Sincerely,          Lisa Bauman, DO        CC: No Recipients

## 2025-05-19 ENCOUNTER — OFFICE VISIT (OUTPATIENT)
Dept: FAMILY MEDICINE CLINIC | Facility: CLINIC | Age: 25
End: 2025-05-19
Payer: COMMERCIAL

## 2025-05-19 VITALS
WEIGHT: 170.8 LBS | HEART RATE: 102 BPM | HEIGHT: 64 IN | DIASTOLIC BLOOD PRESSURE: 88 MMHG | SYSTOLIC BLOOD PRESSURE: 124 MMHG | TEMPERATURE: 98.4 F | BODY MASS INDEX: 29.16 KG/M2 | RESPIRATION RATE: 18 BRPM | OXYGEN SATURATION: 98 %

## 2025-05-19 DIAGNOSIS — R39.9 UTI SYMPTOMS: Primary | ICD-10-CM

## 2025-05-19 LAB
SL AMB  POCT GLUCOSE, UA: NEGATIVE
SL AMB LEUKOCYTE ESTERASE,UA: 125
SL AMB POCT BILIRUBIN,UA: NEGATIVE
SL AMB POCT BLOOD,UA: ABNORMAL
SL AMB POCT CLARITY,UA: ABNORMAL
SL AMB POCT COLOR,UA: YELLOW
SL AMB POCT KETONES,UA: NEGATIVE
SL AMB POCT NITRITE,UA: NEGATIVE
SL AMB POCT PH,UA: 6.5
SL AMB POCT SPECIFIC GRAVITY,UA: 1.01
SL AMB POCT URINE PROTEIN: 300
SL AMB POCT UROBILINOGEN: 0.2

## 2025-05-19 PROCEDURE — 99213 OFFICE O/P EST LOW 20 MIN: CPT | Performed by: NURSE PRACTITIONER

## 2025-05-19 PROCEDURE — 87086 URINE CULTURE/COLONY COUNT: CPT | Performed by: NURSE PRACTITIONER

## 2025-05-19 PROCEDURE — 81002 URINALYSIS NONAUTO W/O SCOPE: CPT | Performed by: NURSE PRACTITIONER

## 2025-05-19 RX ORDER — SULFAMETHOXAZOLE AND TRIMETHOPRIM 800; 160 MG/1; MG/1
1 TABLET ORAL EVERY 12 HOURS SCHEDULED
Qty: 6 TABLET | Refills: 0 | Status: SHIPPED | OUTPATIENT
Start: 2025-05-19 | End: 2025-05-22

## 2025-05-19 NOTE — PROGRESS NOTES
"Name: Meghan Yanez      : 2000      MRN: [id number]  Encounter Provider: NIURKA Rodriguez  Encounter Date: 2025   Encounter department: [facility]  :  Assessment & Plan  UTI symptoms  Urine dip positive for leuks, blood, protein.   Will start Bactrim based on symptoms and dip results for suspected UTI.   Office will call her with results of urine culture.   She will call me if her symptoms should worsen or persist.     Orders:  •  sulfamethoxazole-trimethoprim (BACTRIM DS) 800-160 mg per tablet; Take 1 tablet by mouth every 12 (twelve) hours for 3 days           History of Present Illness   Meghan Yanez is a 24 year old female presenting today for UTI symptoms.     Symptoms started yesterday.     Irritation  Constant urge to void  Dysuria  No hematuria.   No odor.   Cloudy urine.  No fevers, chills, nausea, vomiting, or back pain.     Prone to UTI's in childhood, but it has been long time since last UTI, more than a year.     Not pushing fluids.       Review of Systems   Constitutional: Negative.    HENT: Negative.     Respiratory: Negative.     Cardiovascular: Negative.    Gastrointestinal:  Negative for nausea and vomiting.   Genitourinary:  Positive for dysuria, frequency and urgency. Negative for flank pain, hematuria and pelvic pain.       Objective   /88 (BP Location: Left arm, Patient Position: Sitting, Cuff Size: Standard)   Pulse 102   Temp 98.4 °F (36.9 °C) (Temporal)   Resp 18   Ht 5' 4\" (1.626 m)   Wt 77.5 kg (170 lb 12.8 oz)   LMP  (LMP Unknown)   SpO2 98%   BMI 29.32 kg/m²      Physical Exam  Vitals and nursing note reviewed.   Constitutional:       General: She is not in acute distress.     Appearance: Normal appearance. She is not ill-appearing.     Cardiovascular:      Rate and Rhythm: Normal rate and regular rhythm.      Heart sounds: No murmur heard.  Pulmonary:      Effort: Pulmonary effort is normal.      Breath sounds: Normal breath sounds. "   Abdominal:      Palpations: Abdomen is soft.      Tenderness: There is no abdominal tenderness. There is no right CVA tenderness or left CVA tenderness.     Neurological:      Mental Status: She is alert.     Psychiatric:         Mood and Affect: Mood normal.       Current Medications[1]          [1]    Current Outpatient Medications:   •  Levonorgestrel (Mirena, 52 MG,) 20 MCG/DAY IUD, 1 each by Intrauterine route, Disp: , Rfl:   •  Multiple Vitamins-Minerals (HAIR SKIN AND NAILS FORMULA PO), Take by mouth in the morning, Disp: , Rfl:   •  multivitamin (THERAGRAN) TABS, Take 1 tablet by mouth in the morning., Disp: , Rfl:   •  ondansetron (ZOFRAN-ODT) 4 mg disintegrating tablet, Take 1 tablet (4 mg total) by mouth every 6 (six) hours as needed for nausea or vomiting, Disp: 20 tablet, Rfl: 0  •  sulfamethoxazole-trimethoprim (BACTRIM DS) 800-160 mg per tablet, Take 1 tablet by mouth every 12 (twelve) hours for 3 days, Disp: 6 tablet, Rfl: 0

## 2025-05-20 ENCOUNTER — RESULTS FOLLOW-UP (OUTPATIENT)
Dept: FAMILY MEDICINE CLINIC | Facility: CLINIC | Age: 25
End: 2025-05-20

## 2025-05-20 LAB — BACTERIA UR CULT: NORMAL

## 2025-05-22 ENCOUNTER — TELEPHONE (OUTPATIENT)
Dept: FAMILY MEDICINE CLINIC | Facility: CLINIC | Age: 25
End: 2025-05-22

## 2025-05-22 NOTE — TELEPHONE ENCOUNTER
Please contact patient regarding her question noted below:    There are multiple reasons for UTI symptoms, aside form a UTI, particularly in women.     There could have been a small amount of mixed bacteria in the urinary tract, that was irritating.     Bacterial vaginosis, vaginal yeast infection, pelvic floor weakness, overactive bladder, interstitial cystitis, and more could cause urinary symptoms as well.     If your symptoms resolved, then all is well, if you still have symptoms, please call.         Spoke to patient gave all information, patient wanted to know what could have caused these symptoms, patient stated when she has these symptoms usually means she has a uti, patient stated the only new thing that she can think of is a new sexual partner recently

## 2025-05-23 DIAGNOSIS — Z20.2 POSSIBLE EXPOSURE TO STI: Primary | ICD-10-CM

## 2025-05-23 NOTE — TELEPHONE ENCOUNTER
Yes, it could be an STI, like chlamydia or gonorrhea.   I will place an order for a urine test for Chlamydia or gonorrhea, or she could go to her gynecologist for further evaluation.